# Patient Record
Sex: FEMALE | Race: WHITE | Employment: UNEMPLOYED | URBAN - METROPOLITAN AREA
[De-identification: names, ages, dates, MRNs, and addresses within clinical notes are randomized per-mention and may not be internally consistent; named-entity substitution may affect disease eponyms.]

---

## 2024-06-07 ENCOUNTER — TELEPHONE (OUTPATIENT)
Dept: BARIATRICS/WEIGHT MGMT | Age: 49
End: 2024-06-07

## 2024-06-07 NOTE — TELEPHONE ENCOUNTER
Left voicemail to call office regarding [facility] records we have not received them yet, can't schedule appointment until Dr. Wilhelm looks at them.